# Patient Record
Sex: FEMALE | Race: BLACK OR AFRICAN AMERICAN | NOT HISPANIC OR LATINO | Employment: FULL TIME | URBAN - METROPOLITAN AREA
[De-identification: names, ages, dates, MRNs, and addresses within clinical notes are randomized per-mention and may not be internally consistent; named-entity substitution may affect disease eponyms.]

---

## 2020-04-25 ENCOUNTER — HISTORICAL (OUTPATIENT)
Dept: URGENT CARE | Facility: CLINIC | Age: 35
End: 2020-04-25

## 2020-04-25 LAB
INFLUENZA A ANTIGEN, POC: NEGATIVE
INFLUENZA B ANTIGEN, POC: NEGATIVE
RAPID GROUP A STREP (OHS): NEGATIVE

## 2021-09-30 ENCOUNTER — PATIENT MESSAGE (OUTPATIENT)
Dept: DERMATOLOGY | Facility: CLINIC | Age: 36
End: 2021-09-30

## 2022-04-10 ENCOUNTER — HISTORICAL (OUTPATIENT)
Dept: ADMINISTRATIVE | Facility: HOSPITAL | Age: 37
End: 2022-04-10
Payer: COMMERCIAL

## 2022-04-26 VITALS
DIASTOLIC BLOOD PRESSURE: 93 MMHG | SYSTOLIC BLOOD PRESSURE: 129 MMHG | HEIGHT: 67 IN | WEIGHT: 234.13 LBS | OXYGEN SATURATION: 97 % | BODY MASS INDEX: 36.75 KG/M2

## 2022-09-21 ENCOUNTER — HISTORICAL (OUTPATIENT)
Dept: ADMINISTRATIVE | Facility: HOSPITAL | Age: 37
End: 2022-09-21
Payer: COMMERCIAL

## 2022-10-16 ENCOUNTER — PATIENT MESSAGE (OUTPATIENT)
Dept: ADMINISTRATIVE | Facility: OTHER | Age: 37
End: 2022-10-16
Payer: COMMERCIAL

## 2022-11-27 ENCOUNTER — PATIENT MESSAGE (OUTPATIENT)
Dept: ADMINISTRATIVE | Facility: OTHER | Age: 37
End: 2022-11-27
Payer: COMMERCIAL

## 2022-11-28 ENCOUNTER — TELEPHONE (OUTPATIENT)
Dept: PAIN MEDICINE | Facility: CLINIC | Age: 37
End: 2022-11-28
Payer: COMMERCIAL

## 2022-12-08 ENCOUNTER — OFFICE VISIT (OUTPATIENT)
Dept: DERMATOLOGY | Facility: CLINIC | Age: 37
End: 2022-12-08
Payer: COMMERCIAL

## 2022-12-08 DIAGNOSIS — Z51.81 MEDICATION MONITORING ENCOUNTER: ICD-10-CM

## 2022-12-08 DIAGNOSIS — L70.0 ACNE VULGARIS: Primary | ICD-10-CM

## 2022-12-08 PROCEDURE — 1159F PR MEDICATION LIST DOCUMENTED IN MEDICAL RECORD: ICD-10-PCS | Mod: CPTII,95,, | Performed by: STUDENT IN AN ORGANIZED HEALTH CARE EDUCATION/TRAINING PROGRAM

## 2022-12-08 PROCEDURE — 99204 OFFICE O/P NEW MOD 45 MIN: CPT | Mod: 95,,, | Performed by: STUDENT IN AN ORGANIZED HEALTH CARE EDUCATION/TRAINING PROGRAM

## 2022-12-08 PROCEDURE — 1159F MED LIST DOCD IN RCRD: CPT | Mod: CPTII,95,, | Performed by: STUDENT IN AN ORGANIZED HEALTH CARE EDUCATION/TRAINING PROGRAM

## 2022-12-08 PROCEDURE — 99204 PR OFFICE/OUTPT VISIT, NEW, LEVL IV, 45-59 MIN: ICD-10-PCS | Mod: 95,,, | Performed by: STUDENT IN AN ORGANIZED HEALTH CARE EDUCATION/TRAINING PROGRAM

## 2022-12-08 PROCEDURE — 1160F RVW MEDS BY RX/DR IN RCRD: CPT | Mod: CPTII,95,, | Performed by: STUDENT IN AN ORGANIZED HEALTH CARE EDUCATION/TRAINING PROGRAM

## 2022-12-08 PROCEDURE — 1160F PR REVIEW ALL MEDS BY PRESCRIBER/CLIN PHARMACIST DOCUMENTED: ICD-10-PCS | Mod: CPTII,95,, | Performed by: STUDENT IN AN ORGANIZED HEALTH CARE EDUCATION/TRAINING PROGRAM

## 2022-12-08 RX ORDER — TAZAROTENE 0.45 MG/G
1 LOTION TOPICAL NIGHTLY
Qty: 45 G | Refills: 5 | Status: SHIPPED | OUTPATIENT
Start: 2022-12-08 | End: 2023-10-04 | Stop reason: SDUPTHER

## 2022-12-08 RX ORDER — SPIRONOLACTONE 50 MG/1
50 TABLET, FILM COATED ORAL DAILY
Qty: 30 TABLET | Refills: 5 | Status: SHIPPED | OUTPATIENT
Start: 2022-12-08 | End: 2023-12-08

## 2022-12-08 NOTE — PROGRESS NOTES
Patient Information  Name: Darryl Esposito  : 1985  MRN: [id number]     Referring Physician:  Dr. Young ref. provider found   Primary Care Physician:   Primary Doctor Hannah   Date of Visit: 2022      Subjective:       Darryl Esposito is a 37 y.o. female who presents for acne    HPI  The patient location is: [place], [place]  The chief complaint leading to consultation is: acne    Visit type: audiovisual    Face to Face time with patient: 8 min  10 minutes of total time spent on the encounter, which includes face to face time and non-face to face time preparing to see the patient (eg, review of tests), Obtaining and/or reviewing separately obtained history, Documenting clinical information in the electronic or other health record, Independently interpreting results (not separately reported) and communicating results to the patient/family/caregiver, or Care coordination (not separately reported).     Each patient to whom he or she provides medical services by telemedicine is:  (1) informed of the relationship between the physician and patient and the respective role of any other health care provider with respect to management of the patient; and (2) notified that he or she may decline to receive medical services by telemedicine and may withdraw from such care at any time.    Notes:   Patient with new complaint of lesion(s)  Location: face, shoulder, back  Duration: years  Symptoms: painful, scarring  Relieving factors/Previous treatments: OCPs, OTC acne medications    Acne flares around menstrual cycles    Patient was last seen:Visit date not found     Prior notes by myself reviewed.   Clinical documentation obtained by nursing staff reviewed.    Review of Systems   Skin:  Negative for itching and rash.      Objective:    Physical Exam   Constitutional: She appears well-developed and well-nourished. No distress.   Neurological: She is alert and oriented to person, place, and time. She is not disoriented.    Psychiatric: She has a normal mood and affect.   Skin:   Areas Examined (abnormalities noted in diagram):   Head / Face Inspection Performed  Neck Inspection Performed            Diagram Legend     Erythematous scaling macule/papule c/w actinic keratosis       Vascular papule c/w angioma      Pigmented verrucoid papule/plaque c/w seborrheic keratosis      Yellow umbilicated papule c/w sebaceous hyperplasia      Irregularly shaped tan macule c/w lentigo     1-2 mm smooth white papules consistent with Milia      Movable subcutaneous cyst with punctum c/w epidermal inclusion cyst      Subcutaneous movable cyst c/w pilar cyst      Firm pink to brown papule c/w dermatofibroma      Pedunculated fleshy papule(s) c/w skin tag(s)      Evenly pigmented macule c/w junctional nevus     Mildly variegated pigmented, slightly irregular-bordered macule c/w mildly atypical nevus      Flesh colored to evenly pigmented papule c/w intradermal nevus       Pink pearly papule/plaque c/w basal cell carcinoma      Erythematous hyperkeratotic cursted plaque c/w SCC      Surgical scar with no sign of skin cancer recurrence      Open and closed comedones      Inflammatory papules and pustules      Verrucoid papule consistent consistent with wart     Erythematous eczematous patches and plaques     Dystrophic onycholytic nail with subungual debris c/w onychomycosis     Umbilicated papule    Erythematous-base heme-crusted tan verrucoid plaque consistent with inflamed seborrheic keratosis     Erythematous Silvery Scaling Plaque c/w Psoriasis     See annotation              [] Data reviewed  [] Independent review of test  [] Management discussed with another provider    Assessment / Plan:        Acne vulgaris  -     spironolactone (ALDACTONE) 50 MG tablet; Take 1 tablet (50 mg total) by mouth once daily.  Dispense: 30 tablet; Refill: 5  -     tazarotene (ARAZLO) 0.045 % Lotn; Apply 1 application topically nightly.  Dispense: 45 g; Refill:  5  Discussed benefits and risks of therapy including but not limited to breakthrough bleeding/menstrual irregularities, breast tenderness/enlargement, and elevated potassium levels which may give symptoms of fatigue, palpitations, dizziness, headaches and nausea. Patient should limit potassium intake - avoid potassium supplements or salt substitutes, limit bananas and citrus fruits. Pregnancy must be avoided while taking spironolactone.         LOS NUMBER AND COMPLEXITY OF PROBLEMS    COMPLEXITY OF DATA RISK TOTAL TIME (m)   76946  51400 [] 1 self-limited or minor problem [] Minimal to none [] No treatment recommended or patient to monitor 15-29  10-19   98753  11428 Low  [] 2 or > self limited or minor problems  [] 1 stable chronic illness  [] 1 acute, uncomplicated illness or injury Limited (2)  [] Prior external notes from each unique source  [] Review result of each unique test  [x] Order each unique test []  Low  OTC medications, minor skin biopsy 30-44  20-29   15108  17592 Moderate  [x]  1 or > chronic illness with progression, exacerbation or SE of treatment  []  2 or more stable chronic illnesses  []  1 acute illness with systemic symptoms  []  1 acute complicated injury  []  1 undiagnosed new problem with uncertain prognosis Moderate (1/3 below)  []  3 or more data items        *Now includes assessment requiring independent historian  []  Independent interpretation of a test  []  Discuss management/test with another provider Moderate  [x]  Prescription drug mgmt  []  Minor surgery with risk discussed  []  Mgmt limited by social determinates 45-59  30-39   42767  28240 High  []  1 or more chronic illness with severe exacerbation, progression or SE of treatment  []  1 acute or chronic illness/injury that poses a threat to life or bodily function Extensive (2/3 below)  []  3 or more data items        *Now includes assessment requiring independent historian.  []  Independent interpretation of a test  []   Discuss management/test with another provider High  []  Major surgery with risk discussed  []  Drug therapy requiring intensive monitoring for toxicity  []  Hospitalization  []  Decision for DNR 60-74  40-54      No follow-ups on file.    Violet Strickland MD, FAAD  [facility]

## 2022-12-14 ENCOUNTER — PATIENT MESSAGE (OUTPATIENT)
Dept: DERMATOLOGY | Facility: CLINIC | Age: 37
End: 2022-12-14
Payer: COMMERCIAL

## 2022-12-15 ENCOUNTER — PATIENT MESSAGE (OUTPATIENT)
Dept: GASTROENTEROLOGY | Facility: CLINIC | Age: 37
End: 2022-12-15

## 2022-12-15 ENCOUNTER — OFFICE VISIT (OUTPATIENT)
Dept: GASTROENTEROLOGY | Facility: CLINIC | Age: 37
End: 2022-12-15
Payer: COMMERCIAL

## 2022-12-15 VITALS — BODY MASS INDEX: 34.61 KG/M2 | HEIGHT: 67 IN

## 2022-12-15 DIAGNOSIS — K64.9 HEMORRHOIDS, UNSPECIFIED HEMORRHOID TYPE: ICD-10-CM

## 2022-12-15 DIAGNOSIS — R19.8 ALTERNATING CONSTIPATION AND DIARRHEA: Primary | ICD-10-CM

## 2022-12-15 PROCEDURE — 3008F BODY MASS INDEX DOCD: CPT | Mod: CPTII,95,, | Performed by: INTERNAL MEDICINE

## 2022-12-15 PROCEDURE — 99204 OFFICE O/P NEW MOD 45 MIN: CPT | Mod: 95,,, | Performed by: INTERNAL MEDICINE

## 2022-12-15 PROCEDURE — 99204 PR OFFICE/OUTPT VISIT, NEW, LEVL IV, 45-59 MIN: ICD-10-PCS | Mod: 95,,, | Performed by: INTERNAL MEDICINE

## 2022-12-15 PROCEDURE — 3008F PR BODY MASS INDEX (BMI) DOCUMENTED: ICD-10-PCS | Mod: CPTII,95,, | Performed by: INTERNAL MEDICINE

## 2022-12-15 NOTE — PROGRESS NOTES
[facility]  Gastroenterology    PCP: Primary Doctor No    12/15/22    HPI       Constipation     Additional comments: On and off diarrhea with constipation  Flares up hemorrhoids/about 1 yr          Last edited by Harriet Reddy LPN on 12/15/2022 10:28 AM.        The patient location is: Home  The chief complaint leading to consultation is: Alternating bowel habits, hemorrhoids    Visit type: audiovisual    Face to Face time with patient: 15 minutes of total time spent on the encounter, which includes face to face time and non-face to face time preparing to see the patient (eg, review of tests), Obtaining and/or reviewing separately obtained history, Documenting clinical information in the electronic or other health record, Independently interpreting results (not separately reported) and communicating results to the patient/family/caregiver, or Care coordination (not separately reported).         Each patient to whom he or she provides medical services by telemedicine is:  (1) informed of the relationship between the physician and patient and the respective role of any other health care provider with respect to management of the patient; and (2) notified that he or she may decline to receive medical services by telemedicine and may withdraw from such care at any time.    Notes:       Subjective:   Darryl Esposito is a 37 y.o. female here for evaluation of alternating constipation/diarrhea and hemorrhoids. Patient reports issues with alternating constipation and diarrhea. She will have constipation for 1 week and then the next week will be diarrhea with multiple bowel movements/day. She also has lots of gas/bloating. She has never had a colonoscopy before. She struggles with hemorrhoids which will go away after a week with the use of Preparation H. She tried Miralax which seemed to worsen things and give diarrhea. Gas X helps. She has also tried fiber pills but did not think it helped much.        Past Medical History:   Diagnosis Date    Anxiety disorder, unspecified     Depression     Essential (primary) hypertension     Fibromyalgia     Headache        Past Surgical History:   Procedure Laterality Date    CYSTOSCOPY      dilation and currettage      HYSTEROSCOPY         Current Outpatient Medications on File Prior to Visit   Medication Sig Dispense Refill    spironolactone (ALDACTONE) 50 MG tablet Take 1 tablet (50 mg total) by mouth once daily. 30 tablet 5    tazarotene (ARAZLO) 0.045 % Lotn Apply 1 application topically nightly. 45 g 5    [DISCONTINUED] buPROPion (WELLBUTRIN XL) 300 MG 24 hr tablet Wellbutrin XL Take 1 time per day No date recorded tablet extended release 24 hr 1 time per day No route recorded No set duration recorded No set duration amount recorded active 300 mg      [DISCONTINUED] fluticasone propionate (FLONASE ALLERGY RELIEF NASL)        No current facility-administered medications on file prior to visit.       Review of patient's allergies indicates:   Allergen Reactions    Sulfa (sulfonamide antibiotics) Anaphylaxis and Rash     Throat swells and hives      Sulfamethoxazole-trimethoprim Anaphylaxis, Hives and Other (See Comments)     Other reaction(s): Unknown (qualifier value)      Amoxicillin-pot clavulanate Diarrhea    Tetracyclines        Social History     Socioeconomic History    Marital status: Single   Tobacco Use    Smoking status: Never   Substance and Sexual Activity    Alcohol use: Not Currently    Drug use: Never    Sexual activity: Yes     Partners: Male     Birth control/protection: Coitus interruptus, OCP       Family History   Problem Relation Age of Onset    Kidney disease Mother         Renal Failure Stage 4, Cts Granulomaa    Arthritis Father         RA since age 5.    Cancer Maternal Grandmother         Breast x2, colon, stomach    Cancer Paternal Grandmother         Pancreatic    Asthma Sister        Review of Systems   Constitutional:  Negative for  appetite change, fever and unexpected weight change.   HENT:  Negative for postnasal drip, rhinorrhea, sneezing, sore throat and trouble swallowing.    Eyes:  Negative for visual disturbance.   Respiratory:  Negative for cough, shortness of breath and wheezing.    Cardiovascular:  Negative for chest pain, palpitations and leg swelling.   Gastrointestinal:  Positive for constipation and diarrhea. Negative for abdominal pain, blood in stool, nausea and vomiting.   Genitourinary:  Negative for dysuria.   Musculoskeletal:  Negative for arthralgias, joint swelling and myalgias.   Skin:  Negative for color change, pallor and rash.   Neurological:  Negative for weakness, light-headedness, numbness and headaches.   Hematological:  Negative for adenopathy. Does not bruise/bleed easily.   Psychiatric/Behavioral:  Negative for agitation.          Objective:   Vitals: There were no vitals filed for this visit.    Physical Exam Unable to perform due to video visit    IMPRESSION     Problem List Items Addressed This Visit    None  Visit Diagnoses       Alternating constipation and diarrhea    -  Primary    Hemorrhoids, unspecified hemorrhoid type                PLANS:    - KUB to assess for constipation with overflow diarrhea  - Ok to continue with Preparation H as needed for hemorrhoids. Also can do sitz baths. Avoid straining on toilet bowel  - Further recommendations pending KUB results  - If symptoms continue, will also need to consider colonoscopy    Alternating constipation and diarrhea    Hemorrhoids, unspecified hemorrhoid type      Nilam Feldman MD  Gastroenterology and Hepatology

## 2022-12-19 ENCOUNTER — OFFICE VISIT (OUTPATIENT)
Dept: GASTROENTEROLOGY | Facility: CLINIC | Age: 37
End: 2022-12-19
Payer: COMMERCIAL

## 2022-12-19 DIAGNOSIS — R14.0 BLOATING: ICD-10-CM

## 2022-12-19 DIAGNOSIS — R19.8 ALTERNATING CONSTIPATION AND DIARRHEA: Primary | ICD-10-CM

## 2022-12-19 DIAGNOSIS — R14.3 EXCESSIVE GAS: ICD-10-CM

## 2022-12-19 DIAGNOSIS — R10.84 GENERALIZED ABDOMINAL PAIN: ICD-10-CM

## 2022-12-19 PROCEDURE — 1159F PR MEDICATION LIST DOCUMENTED IN MEDICAL RECORD: ICD-10-PCS | Mod: CPTII,95,, | Performed by: NURSE PRACTITIONER

## 2022-12-19 PROCEDURE — 99214 OFFICE O/P EST MOD 30 MIN: CPT | Mod: 95,,, | Performed by: NURSE PRACTITIONER

## 2022-12-19 PROCEDURE — 99214 PR OFFICE/OUTPT VISIT, EST, LEVL IV, 30-39 MIN: ICD-10-PCS | Mod: 95,,, | Performed by: NURSE PRACTITIONER

## 2022-12-19 PROCEDURE — 1159F MED LIST DOCD IN RCRD: CPT | Mod: CPTII,95,, | Performed by: NURSE PRACTITIONER

## 2022-12-19 RX ORDER — EPINEPHRINE 0.3 MG/.3ML
INJECTION SUBCUTANEOUS
COMMUNITY
Start: 2022-12-16

## 2022-12-19 RX ORDER — LEVONORGESTREL / ETHINYL ESTRADIOL AND ETHINYL ESTRADIOL 150-30(84)
KIT ORAL
COMMUNITY

## 2022-12-19 NOTE — PROGRESS NOTES
Clinic Follow Up:  [facility] Follow Up Note    Reason for Follow Up:  The primary encounter diagnosis was Alternating constipation and diarrhea. Diagnoses of Bloating, Excessive gas, and Generalized abdominal pain were also pertinent to this visit.    PCP: Primary Doctor Hannah     The patient location is: [place]  The chief complaint leading to consultation is: above    Visit type: audiovisual    Face to Face time with patient: 20 minutes  30 minutes of total time spent on the encounter, which includes face to face time and non-face to face time preparing to see the patient (eg, review of tests), Obtaining and/or reviewing separately obtained history, Documenting clinical information in the electronic or other health record, Independently interpreting results (not separately reported) and communicating results to the patient/family/caregiver, or Care coordination (not separately reported).         Each patient to whom he or she provides medical services by telemedicine is:  (1) informed of the relationship between the physician and patient and the respective role of any other health care provider with respect to management of the patient; and (2) notified that he or she may decline to receive medical services by telemedicine and may withdraw from such care at any time.    Notes:       HPI:  This is a 37 y.o. female here for follow up of the above  Pt was recently seen by Dr. Frazier.  Here today for 2nd opinion and clarification of plan  She reports a hx of alternating constipation with diarrhea for over a year.   States she will have a hard to pass stool and then no BM for 4-5 days.  Afterwards will often have rectal discomfort related to hemorrhoids.   The diarrhea is at random times but reports no significant relief of the abdominal pain, bloating or gas.   Has tried OTC miralax but has had worsening of the diarrhea.   Has tried fiber supplement without improvement.   Has not tried any  prescription medication.   No previous colonoscopy.  No fam hx of CRC   No hematochezia.   An x-ray was previously ordered but not yet completed due to scheduling confusion.       Review of Systems   Constitutional:  Negative for chills, fever, malaise/fatigue and weight loss.   Respiratory:  Negative for cough.    Cardiovascular:  Negative for chest pain.   Gastrointestinal:         Per HPI   Musculoskeletal:  Negative for myalgias.   Skin:  Negative for itching and rash.   Neurological:  Negative for headaches.   Psychiatric/Behavioral:  The patient is not nervous/anxious.      Medical History:  Past Medical History:   Diagnosis Date    Anxiety disorder, unspecified     Depression     Essential (primary) hypertension     Fibromyalgia     Headache        Surgical History:   Past Surgical History:   Procedure Laterality Date    CYSTOSCOPY      dilation and currettage      HYSTEROSCOPY         Family History:   Family History   Problem Relation Age of Onset    Kidney disease Mother         Renal Failure Stage 4, Cts Granulomaa    Arthritis Father         RA since age 5.    Cancer Maternal Grandmother         Breast x2, colon, stomach    Cancer Paternal Grandmother         Pancreatic    Asthma Sister        Social History:   Social History     Tobacco Use    Smoking status: Never   Substance Use Topics    Alcohol use: Not Currently    Drug use: Never       Allergies: Reviewed    Home Medications:  Current Outpatient Medications on File Prior to Visit   Medication Sig Dispense Refill    EPINEPHrine (EPIPEN) 0.3 mg/0.3 mL AtIn Inject into the muscle.      L norgest/e.estradioL-e.estrad (SEASONIQUE) 0.15 mg-30 mcg (84)/10 mcg (7) 3MPk Take by mouth.      spironolactone (ALDACTONE) 50 MG tablet Take 1 tablet (50 mg total) by mouth once daily. 30 tablet 5    tazarotene (ARAZLO) 0.045 % Lotn Apply 1 application topically nightly. 45 g 5     No current facility-administered medications on file prior to visit.        Physical Exam:  Vital Signs:  There were no vitals taken for this visit.  There is no height or weight on file to calculate BMI.  Physical Exam  Constitutional:       Appearance: She is well-developed.   HENT:      Head: Normocephalic.   Eyes:      General: No scleral icterus.  Pulmonary:      Effort: Pulmonary effort is normal.   Musculoskeletal:         General: Normal range of motion.      Cervical back: Normal range of motion.   Skin:     General: Skin is dry.   Neurological:      Mental Status: She is alert.       Labs: Pertinent labs reviewed.      Assessment:  1. Alternating constipation and diarrhea    2. Bloating    3. Excessive gas    4. Generalized abdominal pain        Recommendations:  Long discussion with pt on the possible diagnosis of IBS with constipation and diarrhea vs Chronic constipation with overflow diarrhea  - will plan for x-ray to determine stool burden.  Pt may ultimately need a bowel purge for initial treatment  - will likely need either Amitiza or Linzess.   - overall objective of improved symptoms discussed. Pt states understanding  - if the x-ray is unremarkable, will need colonoscopy for further evaluation.       Return to Clinic:    Follow up to be determined by results of above.

## 2022-12-21 ENCOUNTER — OFFICE VISIT (OUTPATIENT)
Dept: PULMONOLOGY | Facility: CLINIC | Age: 37
End: 2022-12-21
Payer: COMMERCIAL

## 2022-12-21 DIAGNOSIS — Z91.09 ENVIRONMENTAL ALLERGIES: ICD-10-CM

## 2022-12-21 DIAGNOSIS — Z82.5 FAMILY HISTORY OF ASTHMA: ICD-10-CM

## 2022-12-21 DIAGNOSIS — Z87.01 HISTORY OF PNEUMONIA: ICD-10-CM

## 2022-12-21 DIAGNOSIS — J45.909 ASTHMA, NOT WELL CONTROLLED, UNSPECIFIED ASTHMA SEVERITY, UNSPECIFIED WHETHER COMPLICATED, UNSPECIFIED WHETHER PERSISTENT: Primary | ICD-10-CM

## 2022-12-21 PROCEDURE — 1159F MED LIST DOCD IN RCRD: CPT | Mod: CPTII,95,, | Performed by: INTERNAL MEDICINE

## 2022-12-21 PROCEDURE — 99204 PR OFFICE/OUTPT VISIT, NEW, LEVL IV, 45-59 MIN: ICD-10-PCS | Mod: 95,,, | Performed by: INTERNAL MEDICINE

## 2022-12-21 PROCEDURE — 1160F PR REVIEW ALL MEDS BY PRESCRIBER/CLIN PHARMACIST DOCUMENTED: ICD-10-PCS | Mod: CPTII,95,, | Performed by: INTERNAL MEDICINE

## 2022-12-21 PROCEDURE — 1160F RVW MEDS BY RX/DR IN RCRD: CPT | Mod: CPTII,95,, | Performed by: INTERNAL MEDICINE

## 2022-12-21 PROCEDURE — 1159F PR MEDICATION LIST DOCUMENTED IN MEDICAL RECORD: ICD-10-PCS | Mod: CPTII,95,, | Performed by: INTERNAL MEDICINE

## 2022-12-21 PROCEDURE — 99204 OFFICE O/P NEW MOD 45 MIN: CPT | Mod: 95,,, | Performed by: INTERNAL MEDICINE

## 2022-12-21 RX ORDER — MONTELUKAST SODIUM 10 MG/1
10 TABLET ORAL NIGHTLY
Qty: 30 TABLET | Refills: 5 | Status: SHIPPED | OUTPATIENT
Start: 2022-12-21 | End: 2023-01-20

## 2022-12-21 RX ORDER — PREDNISONE 10 MG/1
TABLET ORAL
Qty: 21 TABLET | Refills: 0 | Status: SHIPPED | OUTPATIENT
Start: 2022-12-21 | End: 2023-03-27

## 2022-12-21 RX ORDER — ALBUTEROL SULFATE 90 UG/1
2 AEROSOL, METERED RESPIRATORY (INHALATION) EVERY 6 HOURS PRN
Qty: 18 G | Refills: 5 | Status: SHIPPED | OUTPATIENT
Start: 2022-12-21 | End: 2023-12-21

## 2022-12-21 RX ORDER — FLUTICASONE PROPIONATE AND SALMETEROL 250; 50 UG/1; UG/1
1 POWDER RESPIRATORY (INHALATION) 2 TIMES DAILY
Qty: 180 EACH | Refills: 3 | Status: SHIPPED | OUTPATIENT
Start: 2022-12-21 | End: 2023-12-21

## 2022-12-21 NOTE — PROGRESS NOTES
Initial Outpatient Pulmonary Evaluation       SUBJECTIVE:   The patient location is: Home  The chief complaint leading to consultation is: asthma   Visit type: Virtual visit with synchronous audio and video  Total time spent with patient: 25 min    Each patient to whom he or she provides medical services by telemedicine is:  (1) informed of the relationship between the physician and patient and the respective role of any other health care provider with respect to management of the patient; and (2) notified that he or she may decline to receive medical services by telemedicine and may withdraw from such care at any time.  Total time spent in face to face counseling and coordination of care -  15minutes over 50% of time was used in discussion of prognosis, risks, benefits of treatment, instructions and compliance with regimen .     Chief Complaint   Patient presents with    Asthma       History of Present Illness:    Patient is a 37 y.o. female  presenting for evaluation of not well-controlled asthma.      Asthma since childhood.  At some point was on Advair.  Now not well-controlled over the last six-month frequent uses albuterol frequent nighttime symptoms.  Walking precipitating now SOB on exertion and wheezing.    Needs albuterol 2-3 days  / week , nightly x 6 months     Sister w asthma     Hospitalization 6 years ago for asthma exacerbation , and a decade before that in college , no intubation or ICU admission     30 % outside exposed to dust , smoke , wears a mask     Singulair .  History of environmental allergies and was on allergen immunotherapy between 2010 and 2014 with an allergist.      Hospitalized 1 night Dx w double PNA, Omnicef , steroid milan nasal sprays [facility] 11/2022      Not a smoker     Review of Systems   Constitutional:  Positive for fatigue. Negative for fever and chills.   HENT:  Negative for nosebleeds.    Eyes:   Negative for redness.   Respiratory:  Positive for cough, shortness of breath, wheezing, previous hospitalization due to pulmonary problems, dyspnea on extertion and use of rescue inhaler. Negative for choking.    Cardiovascular:  Negative for chest pain.   Genitourinary:  Negative for hematuria.   Endocrine:  Negative for cold intolerance.    Musculoskeletal:  Negative for gait problem.   Skin:  Negative for rash.   Gastrointestinal:  Negative for vomiting.   Neurological:  Negative for syncope.   Hematological:  Negative for adenopathy.   Psychiatric/Behavioral:  Negative for confusion.      Review of patient's allergies indicates:   Allergen Reactions    Sulfa (sulfonamide antibiotics) Anaphylaxis and Rash     Throat swells and hives      Sulfamethoxazole-trimethoprim Anaphylaxis, Hives and Other (See Comments)     Other reaction(s): Unknown (qualifier value)      Amoxicillin-pot clavulanate Diarrhea    Tetracyclines        Current Outpatient Medications   Medication Sig Dispense Refill    albuterol (PROVENTIL/VENTOLIN HFA) 90 mcg/actuation inhaler Inhale 2 puffs into the lungs every 6 (six) hours as needed for Wheezing. Rescue 18 g 5    EPINEPHrine (EPIPEN) 0.3 mg/0.3 mL AtIn Inject into the muscle.      fluticasone-salmeterol diskus inhaler 250-50 mcg Inhale 1 puff into the lungs 2 (two) times daily. Controller 180 each 3    L norgest/e.estradioL-e.estrad (SEASONIQUE) 0.15 mg-30 mcg (84)/10 mcg (7) 3MPk Take by mouth.      montelukast (SINGULAIR) 10 mg tablet Take 1 tablet (10 mg total) by mouth every evening. 30 tablet 5    predniSONE (DELTASONE) 10 MG tablet Prednisone 40 mg daily for 3 days then 20 mg daily for 3 days then 10 mg daily for 3 days 21 tablet 0    spironolactone (ALDACTONE) 50 MG tablet Take 1 tablet (50 mg total) by mouth once daily. 30 tablet 5    tazarotene (ARAZLO) 0.045 % Lotn Apply 1 application topically nightly. 45 g 5     No current facility-administered medications for this visit.        Past Medical History:   Diagnosis Date    Anxiety disorder, unspecified     Depression     Essential (primary) hypertension     Fibromyalgia     Headache      Past Surgical History:   Procedure Laterality Date    CYSTOSCOPY      dilation and currettage      HYSTEROSCOPY       Family History   Problem Relation Age of Onset    Kidney disease Mother         Renal Failure Stage 4, Cts Granulomaa    Arthritis Father         RA since age 5.    Cancer Maternal Grandmother         Breast x2, colon, stomach    Cancer Paternal Grandmother         Pancreatic    Asthma Sister      Social History     Tobacco Use    Smoking status: Never   Substance Use Topics    Alcohol use: Not Currently    Drug use: Never          OBJECTIVE:     Vital Signs (Most Recent)   ]  Wt Readings from Last 2 Encounters:   04/25/22 100.2 kg (221 lb)   04/25/20 106.2 kg (234 lb 2.1 oz)         Physical Exam:  Physical Exam   Constitutional: She is oriented to person, place, and time. She appears well-developed and well-nourished.   Pulmonary/Chest: No stridor. No respiratory distress.   Neurological: She is alert and oriented to person, place, and time.   Psychiatric: She has a normal mood and affect. Her behavior is normal. Judgment and thought content normal.     Laboratory  No results found for: WBC, RBC, HGB, HCT, MCV, MCH, MCHC, RDW, PLT, MPV, GRAN, LYMPH, MONO, EOS, BASO, EOSINOPHIL, BASOPHIL    BMP  No results found for: NA, K, CL, CO2, BUN, CREATININE, CALCIUM, ANIONGAP, ESTGFRAFRICA, EGFRNONAA, AST, ALT, PROT    No results found for: BNP    No results found for: TSH    No results found for: SEDRATE    No results found for: CRP    No results found for: IGE    No results found for: ASPERGILLUS  No results found for: AFUMIGATUSCL     No results found for: ACE    Diagnostic Results:  I have personally reviewed today the following studies :        ASSESSMENT/PLAN:     Asthma, not well controlled, unspecified asthma severity, unspecified  whether complicated, unspecified whether persistent  -     Complete PFT with bronchodilator; Future; Expected date: 01/04/2023  -     Stress test, pulmonary; Future  -     fluticasone-salmeterol diskus inhaler 250-50 mcg; Inhale 1 puff into the lungs 2 (two) times daily. Controller  Dispense: 180 each; Refill: 3  -     predniSONE (DELTASONE) 10 MG tablet; Prednisone 40 mg daily for 3 days then 20 mg daily for 3 days then 10 mg daily for 3 days  Dispense: 21 tablet; Refill: 0  -     albuterol (PROVENTIL/VENTOLIN HFA) 90 mcg/actuation inhaler; Inhale 2 puffs into the lungs every 6 (six) hours as needed for Wheezing. Rescue  Dispense: 18 g; Refill: 5  -     montelukast (SINGULAIR) 10 mg tablet; Take 1 tablet (10 mg total) by mouth every evening.  Dispense: 30 tablet; Refill: 5  -     CBC auto differential; Future; Expected date: 12/21/2022  -     IgE; Future; Expected date: 12/21/2022  -     X-Ray Chest PA And Lateral; Future; Expected date: 12/21/2022    Environmental allergies  -     CBC auto differential; Future; Expected date: 12/21/2022  -     IgE; Future; Expected date: 12/21/2022    Family history of asthma  -     X-Ray Chest PA And Lateral; Future; Expected date: 12/21/2022    History of pneumonia  -     X-Ray Chest PA And Lateral; Future; Expected date: 12/21/2022      Albuterol p.r.n. start Advair 250 mcg 1 puff twice daily.  Backup prednisone taper.  Check CBC IgE chest x-ray and PFT with bronchodilator check 6 minute walk.      Further recommendation to follow above workup.      Advised patient to report emergency room in case of respiratory distress    Follow up in about 1 month (around 1/21/2023).    This note was prepared using voice recognition system and is likely to have sound alike errors that may have been overlooked even after proof reading.  Please call me with any questions    Discussed diagnosis, its evaluation, treatment and usual course. All questions answered.    Thank you for the courtesy of  participating in the care of this patient    Daniela Hopkins MD

## 2023-01-12 ENCOUNTER — PATIENT MESSAGE (OUTPATIENT)
Dept: ADMINISTRATIVE | Facility: OTHER | Age: 38
End: 2023-01-12
Payer: COMMERCIAL

## 2023-03-14 ENCOUNTER — TELEPHONE (OUTPATIENT)
Dept: OTHER | Age: 38
End: 2023-03-14

## 2023-03-14 DIAGNOSIS — I10 HYPERTENSION: Primary | ICD-10-CM

## 2023-03-27 ENCOUNTER — PATIENT MESSAGE (OUTPATIENT)
Dept: ADMINISTRATIVE | Facility: OTHER | Age: 38
End: 2023-03-27
Payer: COMMERCIAL

## 2023-03-27 PROBLEM — I10 ESSENTIAL (PRIMARY) HYPERTENSION: Status: ACTIVE | Noted: 2023-03-27

## 2023-07-07 ENCOUNTER — PATIENT MESSAGE (OUTPATIENT)
Dept: INFECTIOUS DISEASES | Facility: CLINIC | Age: 38
End: 2023-07-07
Payer: COMMERCIAL

## 2023-10-04 DIAGNOSIS — L70.0 ACNE VULGARIS: ICD-10-CM

## 2023-10-06 RX ORDER — TAZAROTENE 0.45 MG/G
1 LOTION TOPICAL NIGHTLY
Qty: 45 G | Refills: 2 | Status: SHIPPED | OUTPATIENT
Start: 2023-10-06

## 2024-03-14 ENCOUNTER — PATIENT MESSAGE (OUTPATIENT)
Dept: DERMATOLOGY | Facility: CLINIC | Age: 39
End: 2024-03-14
Payer: COMMERCIAL

## 2024-04-21 ENCOUNTER — ON-DEMAND VIRTUAL (OUTPATIENT)
Dept: URGENT CARE | Facility: CLINIC | Age: 39
End: 2024-04-21
Payer: COMMERCIAL

## 2024-04-21 DIAGNOSIS — Z76.0 MEDICATION REFILL: ICD-10-CM

## 2024-04-21 DIAGNOSIS — J45.909 ASTHMA, UNSPECIFIED ASTHMA SEVERITY, UNSPECIFIED WHETHER COMPLICATED, UNSPECIFIED WHETHER PERSISTENT: Primary | ICD-10-CM

## 2024-04-21 PROCEDURE — 99213 OFFICE O/P EST LOW 20 MIN: CPT | Mod: 95,,,

## 2024-04-21 RX ORDER — ALBUTEROL SULFATE 1.25 MG/3ML
1.25 SOLUTION RESPIRATORY (INHALATION) EVERY 6 HOURS PRN
Qty: 75 ML | Refills: 0 | Status: SHIPPED | OUTPATIENT
Start: 2024-04-21

## 2024-04-21 RX ORDER — PREDNISONE 20 MG/1
20 TABLET ORAL DAILY
Qty: 5 TABLET | Refills: 0 | Status: SHIPPED | OUTPATIENT
Start: 2024-04-21 | End: 2024-04-26

## 2024-04-21 NOTE — PATIENT INSTRUCTIONS
You must understand that you've received an Urgent Care treatment only and that you may be released before all your medical problems are known or treated. You, the patient, will arrange for follow up care as instructed.  Follow up with your PCP or specialty clinic as directed in the next 1-2 weeks if not improved or as needed.  You can call [phone] to schedule an appointment with the appropriate provider.  If your condition worsens we recommend that you receive another evaluation at the emergency room immediately or contact your primary medical clinics after hours call service to discuss your concerns.  Please return here or go to the Emergency Department for any concerns or worsening of condition.  Please if you smoke please consider quitting. [facility] Smoke cessation hotline number is [phone], available at this number is free counseling and medications to live a healthier life!         If you were prescribed a narcotic or controlled medication, do not drive or operate heavy equipment or machinery while taking these medications.

## 2024-04-21 NOTE — PROGRESS NOTES
Subjective:      Patient ID: Darryl Esposito is a 39 y.o. female at home    Vitals:  vitals were not taken for this visit.     Chief Complaint: Medication Refill      Visit Type: TELE AUDIOVISUAL    Present with the patient at the time of consultation: TELEMED PRESENT WITH PATIENT: None    Past Medical History:   Diagnosis Date    Anxiety disorder, unspecified     Depression     Essential (primary) hypertension     Fibromyalgia     Headache      Past Surgical History:   Procedure Laterality Date    CYSTOSCOPY      dilation and currettage      HYSTEROSCOPY       Review of patient's allergies indicates:   Allergen Reactions    Sulfa (sulfonamide antibiotics) Anaphylaxis and Rash     Throat swells and hives      Sulfamethoxazole-trimethoprim Anaphylaxis, Hives and Other (See Comments)     Other reaction(s): Unknown (qualifier value)      Amoxicillin-pot clavulanate Diarrhea    Tetracyclines      Current Outpatient Medications on File Prior to Visit   Medication Sig Dispense Refill    amlodipine-benazepril 5-20 mg (LOTREL) 5-20 mg per capsule Take 1 capsule by mouth once daily.      CARVEDILOL PHOSPHATE 20 MG ORAL CM24 (COREG CR) 20 mg 24 hr capsule Take 20 mg by mouth once daily.      EPINEPHrine (EPIPEN) 0.3 mg/0.3 mL AtIn Inject into the muscle.      fluticasone-salmeterol diskus inhaler 250-50 mcg Inhale 1 puff into the lungs 2 (two) times daily. Controller 180 each 3    L norgest/e.estradioL-e.estrad (SEASONIQUE) 0.15 mg-30 mcg (84)/10 mcg (7) 3MPk Take by mouth.      multivitamin capsule Take 1 capsule by mouth once daily.      spironolactone (ALDACTONE) 50 MG tablet Take 1 tablet (50 mg total) by mouth once daily. 30 tablet 5    tazarotene (ARAZLO) 0.045 % Lotn Apply 1 application  topically nightly. 45 g 2    verapamiL (VERELAN) 240 MG C24P Take 240 mg by mouth once daily.       No current facility-administered medications on file prior to visit.     Family History   Problem Relation Name Age of Onset     Kidney disease Mother Kiara Esposito         Renal Failure Stage 4, Cts Granulomaa    Arthritis Father Mart Esposito         RA since age 5.    Cancer Maternal Grandmother Lakshmi Kapoor         Breast x2, colon, stomach    Cancer Paternal Grandmother Jonathan Esposito         Pancreatic    Asthma Sister Frances Esposito        Medications Ordered                [facility] - [place], [place] - [address]   [address], [place] [address]    Telephone: [phone]   Fax: [phone]   Hours: Not open 24 hours                         E-Prescribed (2 of 2)              albuterol (ACCUNEB) 1.25 mg/3 mL Nebu    Sig: Take 3 mLs (1.25 mg total) by nebulization every 6 (six) hours as needed (wheezing). Rescue       Start: 4/21/24     Quantity: 75 mL Refills: 0                         predniSONE (DELTASONE) 20 MG tablet    Sig: Take 1 tablet (20 mg total) by mouth once daily. for 5 days       Start: 4/21/24     Quantity: 5 tablet Refills: 0                           Ohs Peq Odvv Intake    4/21/2024  8:26 AM CDT - Filed by Patient   What is your current physical address in the event of a medical emergency? [address] [place] [address]   Are you able to take your vital signs? Yes   Systolic Blood Pressure: 119   Diastolic Blood Pressure: 72   Weight: 236   Height: 67   Pulse: 100   Temperature: 99.2   Respiration rate:    Pulse Oxygen: 100   Please attach any relevant images or files          Patient states that she believes that she is having an asthma flare up which has been occurring for the past 3 days. Patient states that she has been having sob with wheezing. Patient states that she believes she needs a steroid and nebulizer refill. Patient denies any chest pain or other symptoms at this time         Constitution: Negative.   HENT: Negative.     Neck: neck negative.   Cardiovascular: Negative.    Eyes: Negative.    Respiratory:  Positive for shortness of breath, wheezing and asthma.     Gastrointestinal: Negative.    Endocrine: negative.   Genitourinary: Negative.    Musculoskeletal: Negative.    Skin: Negative.    Allergic/Immunologic: Negative.  Positive for asthma.   Neurological: Negative.    Hematologic/Lymphatic: Negative.    Psychiatric/Behavioral: Negative.          Objective:   The physical exam was conducted virtually.  Physical Exam   Constitutional: She is oriented to person, place, and time.   HENT:   Head: Normocephalic and atraumatic.   Nose: Nose normal.   Eyes: Conjunctivae are normal. Pupils are equal, round, and reactive to light. Extraocular movement intact   Neck: Neck supple.   Cardiovascular: Normal pulses.   Abdominal: Normal appearance.   Musculoskeletal: Normal range of motion.         General: Normal range of motion.   Neurological: no focal deficit. She is alert, oriented to person, place, and time and at baseline.   Skin: Skin is warm.   Psychiatric: Her behavior is normal. Mood, judgment and thought content normal.       Assessment:     1. Asthma, unspecified asthma severity, unspecified whether complicated, unspecified whether persistent    2. Medication refill        Plan:       Asthma, unspecified asthma severity, unspecified whether complicated, unspecified whether persistent  -     albuterol (ACCUNEB) 1.25 mg/3 mL Nebu; Take 3 mLs (1.25 mg total) by nebulization every 6 (six) hours as needed (wheezing). Rescue  Dispense: 75 mL; Refill: 0  -     predniSONE (DELTASONE) 20 MG tablet; Take 1 tablet (20 mg total) by mouth once daily. for 5 days  Dispense: 5 tablet; Refill: 0    Medication refill  -     albuterol (ACCUNEB) 1.25 mg/3 mL Nebu; Take 3 mLs (1.25 mg total) by nebulization every 6 (six) hours as needed (wheezing). Rescue  Dispense: 75 mL; Refill: 0  -     predniSONE (DELTASONE) 20 MG tablet; Take 1 tablet (20 mg total) by mouth once daily. for 5 days  Dispense: 5 tablet; Refill: 0

## 2024-04-25 ENCOUNTER — ON-DEMAND VIRTUAL (OUTPATIENT)
Dept: URGENT CARE | Facility: CLINIC | Age: 39
End: 2024-04-25
Payer: COMMERCIAL

## 2024-04-25 DIAGNOSIS — R06.00 DYSPNEA, UNSPECIFIED TYPE: Primary | ICD-10-CM

## 2024-04-25 DIAGNOSIS — R06.2 WHEEZING: ICD-10-CM

## 2024-04-25 PROCEDURE — 99213 OFFICE O/P EST LOW 20 MIN: CPT | Mod: 95,,, | Performed by: PHYSICIAN ASSISTANT

## 2024-04-25 NOTE — PROGRESS NOTES
Subjective:      Patient ID: Darryl Esposito is a 39 y.o. female.    Vitals:  vitals were not taken for this visit.     Chief Complaint: Shortness of Breath      Visit Type: TELE AUDIOVISUAL    Present with the patient at the time of consultation: TELEMED PRESENT WITH PATIENT: None   Location - at home in [place].     Past Medical History:   Diagnosis Date    Anxiety disorder, unspecified     Depression     Essential (primary) hypertension     Fibromyalgia     Headache      Past Surgical History:   Procedure Laterality Date    CYSTOSCOPY      dilation and currettage      HYSTEROSCOPY       Review of patient's allergies indicates:   Allergen Reactions    Sulfa (sulfonamide antibiotics) Anaphylaxis and Rash     Throat swells and hives      Sulfamethoxazole-trimethoprim Anaphylaxis, Hives and Other (See Comments)     Other reaction(s): Unknown (qualifier value)      Amoxicillin-pot clavulanate Diarrhea    Tetracyclines      Current Outpatient Medications on File Prior to Visit   Medication Sig Dispense Refill    albuterol (ACCUNEB) 1.25 mg/3 mL Nebu Take 3 mLs (1.25 mg total) by nebulization every 6 (six) hours as needed (wheezing). Rescue 75 mL 0    amlodipine-benazepril 5-20 mg (LOTREL) 5-20 mg per capsule Take 1 capsule by mouth once daily.      CARVEDILOL PHOSPHATE 20 MG ORAL CM24 (COREG CR) 20 mg 24 hr capsule Take 20 mg by mouth once daily.      EPINEPHrine (EPIPEN) 0.3 mg/0.3 mL AtIn Inject into the muscle.      fluticasone-salmeterol diskus inhaler 250-50 mcg Inhale 1 puff into the lungs 2 (two) times daily. Controller 180 each 3    L norgest/e.estradioL-e.estrad (SEASONIQUE) 0.15 mg-30 mcg (84)/10 mcg (7) 3MPk Take by mouth.      multivitamin capsule Take 1 capsule by mouth once daily.      predniSONE (DELTASONE) 20 MG tablet Take 1 tablet (20 mg total) by mouth once daily. for 5 days 5 tablet 0    spironolactone (ALDACTONE) 50 MG tablet Take 1 tablet (50 mg total) by mouth once daily. 30 tablet 5    tazarotene  (ARAZLO) 0.045 % Lotn Apply 1 application  topically nightly. 45 g 2    verapamiL (VERELAN) 240 MG C24P Take 240 mg by mouth once daily.       No current facility-administered medications on file prior to visit.     Family History   Problem Relation Name Age of Onset    Kidney disease Mother Kiara Esposito         Renal Failure Stage 4, Cts Granulomaa    Arthritis Father Mart Esposito         RA since age 5.    Cancer Maternal Grandmother Lakshmi Kapoor         Breast x2, colon, stomach    Cancer Paternal Grandmother Jonathan Esposito         Pancreatic    Asthma Sister Frances Esposito            Ohs Peq Odvv Intake    4/24/2024 11:40 PM CDT - Filed by Patient   What is your current physical address in the event of a medical emergency? [address] [place] [address]   Are you able to take your vital signs? Yes   Systolic Blood Pressure: 138   Diastolic Blood Pressure: 93   Weight: 244   Height: 66   Pulse: 97   Temperature: 99.8   Respiration rate: 97   Pulse Oxygen: 99   Please attach any relevant images or files          HPI  40yo female presents for follow up. Has been taking albuterol and prednisone since last visit on 4/21. Still with shortness of breath and wheezing. Also with cough at night. Using breathing treatments every four hours without improvement.     HR 97.      Respiratory:  Positive for cough (at night), shortness of breath and wheezing.         Objective:   The physical exam was conducted virtually.  Physical Exam   Constitutional: She is oriented to person, place, and time.  Non-toxic appearance. She does not appear ill. No distress.   HENT:   Head: Normocephalic and atraumatic.   Neck: Neck supple.   Pulmonary/Chest: Effort normal. No respiratory distress. She has no wheezes.   Abdominal: Normal appearance.   Neurological: She is alert and oriented to person, place, and time. Coordination normal.   Skin: Skin is dry, not diaphoretic, not pale and no rash.   Psychiatric: Her behavior is normal.  Judgment and thought content normal.       Assessment:     1. Dyspnea, unspecified type    2. Wheezing        Plan:       Dyspnea, unspecified type    Wheezing    Unchanged with oral steroids and continuous neb treatments. Needs in person evaluation.  Please go to the emergency room now for further evaluation. Pt VU and agreement with plan.

## 2024-05-13 ENCOUNTER — E-VISIT (OUTPATIENT)
Dept: FAMILY MEDICINE | Facility: CLINIC | Age: 39
End: 2024-05-13
Payer: COMMERCIAL

## 2024-05-13 DIAGNOSIS — M54.9 BACK PAIN, UNSPECIFIED BACK LOCATION, UNSPECIFIED BACK PAIN LATERALITY, UNSPECIFIED CHRONICITY: Primary | ICD-10-CM

## 2024-05-13 PROCEDURE — 99423 OL DIG E/M SVC 21+ MIN: CPT | Mod: ,,, | Performed by: STUDENT IN AN ORGANIZED HEALTH CARE EDUCATION/TRAINING PROGRAM

## 2024-05-13 RX ORDER — METHYLPREDNISOLONE 4 MG/1
TABLET ORAL
Qty: 21 TABLET | Refills: 0 | Status: SHIPPED | OUTPATIENT
Start: 2024-05-13

## 2024-05-13 RX ORDER — MELOXICAM 15 MG/1
15 TABLET ORAL DAILY PRN
Qty: 30 TABLET | Refills: 1 | Status: SHIPPED | OUTPATIENT
Start: 2024-05-13 | End: 2024-05-13

## 2024-05-13 RX ORDER — METAXALONE 400 MG/1
400 TABLET ORAL 3 TIMES DAILY
Qty: 30 TABLET | Refills: 0 | Status: SHIPPED | OUTPATIENT
Start: 2024-05-13 | End: 2024-05-14

## 2024-05-13 RX ORDER — MELOXICAM 15 MG/1
15 TABLET ORAL DAILY PRN
Start: 2024-05-13 | End: 2024-06-12

## 2024-05-13 NOTE — PROGRESS NOTES
Patient ID: Darryl Esposito is a 39 y.o. female.    Chief Complaint: Back Pain (Entered automatically based on patient selection in Patient Portal.)             274}  The patient initiated a request through [employer] on 5/13/2024 for evaluation and management with a chief complaint of Back Pain (Entered automatically based on patient selection in Patient Portal.)     I evaluated the questionnaire submission on 05/13/2024 .    Total Time (in minutes): 22     Ohs Peq Evisit Back Pain    5/13/2024  3:07 PM CDT - Filed by Patient   Do you agree to participate in an E-Visit? Yes   If you have any of the following symptoms, please present to your local ER or call 911: I acknowledge   What is the main issue you would like addressed today? Mid/low back pain;  pulled muscle while getting something out if the attic;  tried ice and heat, tylenol no relief   Are you able to take your vital signs? Yes   Systolic Blood Pressure: 124   Diastolic Blood Pressure: 83   Weight: 235   Height: 66   Pulse: 94   Temperature: 98.6   Respiration rate:    Pulse Oxygen:    Are you pregnant, could you be pregnant, or are you breast feeding? None of the above   Where are you having pain? Middle Back   Does the pain extend into your legs? No   How bad is the pain? The pain is moderate   Did you have an injury that caused the pain? Yes, the pain started after an injury   Please describe the circumstances of your injury. Pulling summer items from the attic to replace with winter items and felt a twinge/pop in lower to mid back, left hand side   How long has the pain been present? Today and yesterday   Have you had back pain in the past? Yes, I have infrequently had pain similar to this before   Please list any medications or treatments you have used for back pain and indicate if it was effective or not. Soma ; Baclofen; Prescription ibuprofen and celebrex/ibuprofen   Do you have a fever? No, I do not have a fever   Do you have any of the  following? None of the above   What makes the pain worse? Bending over;  Strenuous activity   What makes the pain better? Hot or cold compress   Have you ever been diagnosed with cancer? No   Have you ever been diagnosed with degenerative disc disease (arthritis of the spine)? No   Have you ever been diagnosed with osteoporosis or any other bone weakness? No   Have you ever had surgery on your back or spine? No   What is your usual health status? I am active and can move normally   Provide any additional information you feel is important.    Please attach any relevant images or files           Active Problem List with Overview Notes    Diagnosis Date Noted    Essential (primary) hypertension 03/27/2023    Alternating constipation and diarrhea 12/19/2022      Recent Labs Obtained:  Lab Results   Component Value Date     04/09/2024    K 3.6 04/09/2024    CREATININE 0.94 04/09/2024      Review of patient's allergies indicates:   Allergen Reactions    Sulfa (sulfonamide antibiotics) Anaphylaxis and Rash     Throat swells and hives      Sulfamethoxazole-trimethoprim Anaphylaxis, Hives and Other (See Comments)     Other reaction(s): Unknown (qualifier value)      Amoxicillin-pot clavulanate Diarrhea    Tetracyclines        Encounter Diagnosis   Name Primary?    Back pain, unspecified back location, unspecified back pain laterality, unspecified chronicity Yes        No orders of the defined types were placed in this encounter.     Medications Ordered This Encounter   Medications    meloxicam (MOBIC) 15 MG tablet     Sig: Take 1 tablet (15 mg total) by mouth daily as needed for Pain.    metaxalone (SKELAXIN) 400 mg tablet     Sig: Take 1 tablet (400 mg total) by mouth 3 (three) times daily. for 10 days     Dispense:  30 tablet     Refill:  0    methylPREDNISolone (MEDROL DOSEPACK) 4 mg tablet     Sig: follow package directions     Dispense:  21 tablet     Refill:  0        E-Visit Time Tracking:    Day 1 Time (in  minutes): 22    Total Time (in minutes): 22         274}

## 2024-05-14 ENCOUNTER — TELEPHONE (OUTPATIENT)
Dept: FAMILY MEDICINE | Facility: CLINIC | Age: 39
End: 2024-05-14
Payer: COMMERCIAL

## 2024-05-14 RX ORDER — METAXALONE 800 MG/1
400 TABLET ORAL 3 TIMES DAILY
Qty: 15 TABLET | Refills: 0 | Status: SHIPPED | OUTPATIENT
Start: 2024-05-14 | End: 2024-05-24

## 2024-05-14 NOTE — TELEPHONE ENCOUNTER
"----- Message from Elroy Brady MD sent at 5/14/2024  7:03 AM CDT -----  Regarding: skelaxin  Please see why pharmacy had trouble with skelaxin thx    Medications Ordered            [facility] - [place], [place] - [address]   [address], [facility] [address]    Telephone: [phone]   Fax: [phone]   Hours: Not open 24 hours              Pt reports   "My pharmacy is requesting clarification before filling the skelaxin.      There contact info is as follows: [phone]. They open at 7:30am.      Thank you.     Darryl Esposito"  "

## 2024-05-14 NOTE — TELEPHONE ENCOUNTER
----- Message from Juan Chiang LPN sent at 5/13/2024  4:02 PM CDT -----  Regarding: FW: Reschedule Appt    ----- Message -----  From: Alex Onofre  Sent: 5/13/2024   3:53 PM CDT  To: [facility] Staff  Subject: Reschedule Appt                                  Type:  Reschedule Appointment Request    Caller is requesting to reschedule appointment.      Name of Caller:Jo    Date of previous appointment?    [facility] - [place] [address]  [address] [place] [address]  Phone: [phone] Fax: [phone]        Symptoms:metaxalone (SKELAXIN) 400 mg tablet     Would the patient rather a call back or a response via [facility]? Very expensive wants to know if it can be changed to 800 MG and pt take half a tablet    Best Call Back Number:[phone]    Additional Information: \ Please advise -- Thank you

## 2024-06-24 ENCOUNTER — E-VISIT (OUTPATIENT)
Dept: FAMILY MEDICINE | Facility: CLINIC | Age: 39
End: 2024-06-24
Payer: COMMERCIAL

## 2024-06-24 DIAGNOSIS — R05.2 SUBACUTE COUGH: ICD-10-CM

## 2024-06-24 DIAGNOSIS — J01.00 SUBACUTE MAXILLARY SINUSITIS: Primary | ICD-10-CM

## 2024-06-24 RX ORDER — AZITHROMYCIN 250 MG/1
TABLET, FILM COATED ORAL
Qty: 6 TABLET | Refills: 0 | Status: SHIPPED | OUTPATIENT
Start: 2024-06-24

## 2024-06-24 RX ORDER — PROMETHAZINE HYDROCHLORIDE AND DEXTROMETHORPHAN HYDROBROMIDE 6.25; 15 MG/5ML; MG/5ML
5 SYRUP ORAL EVERY 6 HOURS PRN
Qty: 118 ML | Refills: 1 | Status: SHIPPED | OUTPATIENT
Start: 2024-06-24 | End: 2024-07-04

## 2024-06-24 RX ORDER — DESLORATADINE 5 MG/1
5 TABLET ORAL DAILY
Qty: 7 TABLET | Refills: 0 | Status: SHIPPED | OUTPATIENT
Start: 2024-06-24 | End: 2024-07-01

## 2024-06-24 NOTE — PROGRESS NOTES
Patient ID: Darryl Esposito is a 39 y.o. female.    Chief Complaint: URI (Entered automatically based on patient selection in Patient Portal.)          274}  The patient initiated a request through [employer] on 6/24/2024 for evaluation and management with a chief complaint of URI (Entered automatically based on patient selection in Patient Portal.)     I evaluated the questionnaire submission on 06/24/2024 .    Total Time (in minutes): 12     [facility] P[facility] E-Visit Covid    6/24/2024  1:42 PM CDT - Filed by Patient   Do you agree to participate in an E-Visit? Yes   If you have any of the following symptoms, go to your local emergency room or call 911: I acknowledge   Are you pregnant, could you be pregnant, or are you breast feeding? None of the above   What is the main issue you would like addressed today? Sinus pain pressure, headache, facial tenderness, nausea   Do you think you might have COVID or the Flu? No   Have you tested positive for COVID or Flu? No   What symptoms do you currently have?  Cough;  Headache;  Nasal Congestion;  Nausea;  Runny nose;  Sore throat;  Pain around the nose and face   Describe your cough: Dry;  Bothersome (interferes with daily activities)   Have you had any of the following? None of the above   Have you ever smoked? I have never smoked   Have you had a fever? No   When did your symptoms first appear? 6/21/2024   In the last two weeks, have you been in close contact with someone who has COVID-19 or the Flu? No   List what you have done or taken to help your symptoms. Coricidin HBP, zofran, tylenol, carbinoxamine   How severe are your symptoms? Moderate   Have your symptoms gotten better or worse since they started?  No change   Do you have transportation to get testing if it is needed and ordered for you at an [facility] location? Yes   Provide any additional information you feel is important.    Please attach any relevant images or files    Are you able to take your vital signs? Yes    Systolic Blood Pressure: 134   Diastolic Blood Pressure: 83   Weight: 234   Height: 66   Pulse: 88   Temperature: 99.5   Respiration rate:    Pulse Oxygen: 100          Active Problem List with Overview Notes    Diagnosis Date Noted    Essential (primary) hypertension 03/27/2023    Alternating constipation and diarrhea 12/19/2022      Recent Labs Obtained:  Lab Results   Component Value Date     04/09/2024    K 3.6 04/09/2024    CREATININE 0.94 04/09/2024      Review of patient's allergies indicates:   Allergen Reactions    Sulfa (sulfonamide antibiotics) Anaphylaxis and Rash     Throat swells and hives      Sulfamethoxazole-trimethoprim Anaphylaxis, Hives and Other (See Comments)     Other reaction(s): Unknown (qualifier value)      Amoxicillin-pot clavulanate Diarrhea    Tetracyclines        Encounter Diagnoses   Name Primary?    Subacute maxillary sinusitis Yes    Subacute cough         No orders of the defined types were placed in this encounter.     Medications Ordered This Encounter   Medications    azithromycin (Z-ANA) 250 MG tablet     Sig: Take as directed.     Dispense:  6 tablet     Refill:  0    desloratadine (CLARINEX) 5 mg tablet     Sig: Take 1 tablet (5 mg total) by mouth once daily. for 7 days     Dispense:  7 tablet     Refill:  0    promethazine-dextromethorphan (PROMETHAZINE-DM) 6.25-15 mg/5 mL Syrp     Sig: Take 5 mLs by mouth every 6 (six) hours as needed (cough).     Dispense:  118 mL     Refill:  1        E-Visit Time Tracking:    Day 1 Time (in minutes): 12    Total Time (in minutes): 12      274}

## 2024-09-30 ENCOUNTER — E-VISIT (OUTPATIENT)
Dept: FAMILY MEDICINE | Facility: CLINIC | Age: 39
End: 2024-09-30
Payer: COMMERCIAL

## 2024-09-30 DIAGNOSIS — L70.9 ACNE, UNSPECIFIED ACNE TYPE: ICD-10-CM

## 2024-09-30 DIAGNOSIS — L70.0 ACNE VULGARIS: Primary | ICD-10-CM

## 2024-09-30 RX ORDER — TAZAROTENE 1 MG/G
CREAM TOPICAL
Qty: 60 G | Refills: 2 | Status: SHIPPED | OUTPATIENT
Start: 2024-09-30

## 2024-09-30 RX ORDER — CLINDAMYCIN PHOSPHATE AND BENZOYL PEROXIDE 10; 50 MG/G; MG/G
GEL TOPICAL
Qty: 45 G | Refills: 2 | Status: SHIPPED | OUTPATIENT
Start: 2024-09-30 | End: 2024-10-02

## 2024-09-30 NOTE — PROGRESS NOTES
Patient ID: Darryl Esposito is a 39 y.o. female.    Chief Complaint: General Illness (Entered automatically based on patient selection in [employer].)          274}  The patient initiated a request through [employer] on 9/30/2024 for evaluation and management with a chief complaint of General Illness (Entered automatically based on patient selection in [employer].)     I evaluated the questionnaire submission on 09/30/2024 .    Total Time (in minutes): 13     [facility] Peq Evisit [facility]-Skin Hair Nails    9/30/2024  1:30 PM CDT - Filed by Patient   What do you need help with? Skin   What concern do you have about your skin? Acne   Do you agree to participate in an E-Visit? Yes   If you have any of the following symptoms, please present to your local emergency room or call 911:  I acknowledge   Are you pregnant, could you be pregnant, or are you breast feeding? None of the above   What is the main issue you would like addressed today? Acne, oily skin, scarring.   How would you describe your skin problem? Lump or bump   When did your symptoms first appear? 5/16/2001   Where is it located?  Face;  Chest;  Back   Does it itch? No   Does it hurt? No   Is there discharge or drainage? No   Is there bleeding? No   Describe the character Spots;  Closed   Describe the color Brown   Has it changed over time? No change   Frequency of skin problem Fluctuates at random   Duration of the skin problem (how long does it stay when it is present) Never goes away   I have had a new exposure to No new exposures   I have had a new exposure to No new exposures   What have you used to treat the skin problem? Antibiotics, epiduo cream. Hydroquinone   If you have used anything for treatment, has it helped the symptoms? Yes   Other generalized symptoms that you associate with the rash No other symptoms   Provide any additional information you feel is important. Just simoly acne with oily skin, would like to try Rxs again.   At least one photo is  required for treatment to be provided. You can upload a maximum of three photos of the affected area.     Are you able to take your vital signs? Yes   Systolic Blood Pressure: 128   Diastolic Blood Pressure: 76   Weight: 235   Height: 66   Pulse: 65   Temperature: 99   Respiration rate:    Pulse Oxygen: 100          Active Problem List with Overview Notes    Diagnosis Date Noted    Essential (primary) hypertension 03/27/2023    Alternating constipation and diarrhea 12/19/2022      Recent Labs Obtained:  Lab Results   Component Value Date     04/09/2024    K 3.6 04/09/2024    CREATININE 0.94 04/09/2024    TSH 2.631 02/20/2024      Review of patient's allergies indicates:   Allergen Reactions    Sulfa (sulfonamide antibiotics) Anaphylaxis and Rash     Throat swells and hives      Sulfamethoxazole-trimethoprim Anaphylaxis, Hives and Other (See Comments)     Other reaction(s): Unknown (qualifier value)      Amoxicillin-pot clavulanate Diarrhea    Tetracyclines        Encounter Diagnoses   Name Primary?    Acne vulgaris Yes    Acne, unspecified acne type         No orders of the defined types were placed in this encounter.     Medications Ordered This Encounter   Medications    clindamycin-benzoyl peroxide gel     Sig: AAA face qam to bid     Dispense:  45 g     Refill:  2    tazarotene (AVAGE) 0.1 % cream     Sig: Apply 1 application  topically nightly.     Dispense:  60 g     Refill:  2        E-Visit Time Tracking:    Day 1 Time (in minutes): 13    Total Time (in minutes): 13      274}

## 2024-10-02 DIAGNOSIS — L70.0 ACNE VULGARIS: ICD-10-CM

## 2024-10-02 RX ORDER — CLINDAMYCIN PHOSPHATE AND BENZOYL PEROXIDE 10; 50 MG/G; MG/G
GEL TOPICAL
Qty: 45 G | Refills: 2 | Status: SHIPPED | OUTPATIENT
Start: 2024-10-02 | End: 2024-10-02

## 2024-10-02 RX ORDER — CLINDAMYCIN AND BENZOYL PEROXIDE 10; 50 MG/G; MG/G
GEL TOPICAL 2 TIMES DAILY
Qty: 25 G | Refills: 2 | Status: SHIPPED | OUTPATIENT
Start: 2024-10-02 | End: 2024-12-31

## 2024-10-02 RX ORDER — CLINDAMYCIN AND BENZOYL PEROXIDE 10; 50 MG/G; MG/G
GEL TOPICAL
Qty: 50 G | Refills: 2 | OUTPATIENT
Start: 2024-10-02